# Patient Record
Sex: FEMALE | Race: WHITE | URBAN - METROPOLITAN AREA
[De-identification: names, ages, dates, MRNs, and addresses within clinical notes are randomized per-mention and may not be internally consistent; named-entity substitution may affect disease eponyms.]

---

## 2017-07-21 LAB
HBV SURFACE AG SERPL QL IA: NORMAL
HIV 1+2 AB+HIV1 P24 AG SERPL QL IA: NORMAL
RUBELLA ABY IGG: NORMAL
T PALLIDUM IGG SER QL: NORMAL

## 2017-08-28 ENCOUNTER — RADIANT APPOINTMENT (OUTPATIENT)
Dept: GENERAL RADIOLOGY | Facility: CLINIC | Age: 32
End: 2017-08-28
Attending: FAMILY MEDICINE
Payer: COMMERCIAL

## 2017-08-28 ENCOUNTER — OFFICE VISIT (OUTPATIENT)
Dept: URGENT CARE | Facility: URGENT CARE | Age: 32
End: 2017-08-28
Payer: OTHER MISCELLANEOUS

## 2017-08-28 VITALS
SYSTOLIC BLOOD PRESSURE: 128 MMHG | OXYGEN SATURATION: 100 % | DIASTOLIC BLOOD PRESSURE: 70 MMHG | HEART RATE: 92 BPM | WEIGHT: 225 LBS | TEMPERATURE: 98.3 F

## 2017-08-28 DIAGNOSIS — S99.911A ANKLE INJURY, RIGHT, INITIAL ENCOUNTER: Primary | ICD-10-CM

## 2017-08-28 PROCEDURE — 73630 X-RAY EXAM OF FOOT: CPT | Mod: RT

## 2017-08-28 PROCEDURE — 99203 OFFICE O/P NEW LOW 30 MIN: CPT | Performed by: FAMILY MEDICINE

## 2017-08-28 RX ORDER — PRENATAL VIT/IRON FUM/FOLIC AC 27MG-0.8MG
1 TABLET ORAL DAILY
COMMUNITY

## 2017-08-28 NOTE — PROGRESS NOTES
SUBJECTIVE:  Chief Complaint   Patient presents with     Work Comp     Pt states twsited ankle at work 14x weeks pregant 3x hrs     Urgent Care   .ident presents with a chief complaint of right foot.  The injury occurred hours ago.   The injury happened while at work.   How: twisted immediate pain  The patient complained of moderate pain and has had decreased ROM.    Pain exacerbated by weight-bearing and movement    He treated it initially with no therapy.   This is the first time this type of injury has occurred to this patient.     No past medical history on file.  Allergies   Allergen Reactions     Vicodin [Hydrocodone-Acetaminophen]      Social History   Substance Use Topics     Smoking status: Never Smoker     Smokeless tobacco: Not on file     Alcohol use Yes      Comment: occ       ROSINTEGUMENTARY/SKIN: NEGATIVE for open wound/bleeding and NEGATIVE for bruising  MUSCULOSKELETAL: NEGATIVE for joint swelling, paresthesias, radicular pain    EXAM: /70 (BP Location: Left arm, Patient Position: Chair, Cuff Size: Adult Regular)  Pulse 92  Temp 98.3  F (36.8  C) (Oral)  Wt 225 lb (102.1 kg)  SpO2 100%Gen: healthy,alert,no distress  Extremity: foot has pain with palpation and rom.   There is not compromise to the distal circulation.  Pulses are +2 and CRT is brisk.GENERAL APPEARANCE: healthy, alert and no distress  EXTREMITIES: peripheral pulses normal  SKIN: no suspicious lesions or rashes  NEURO: Normal strength and tone, sensory exam grossly normal, mentation intact and speech normal    Xray without acute findings, read by Adams Adkins D.O.      ICD-10-CM    1. Ankle injury, right, initial encounter S99.911A XR Foot Right G/E 3 Views     order for DME     RICE  F/U RECHECK 48-72 HRS

## 2017-08-28 NOTE — LETTER
[facility] [address] [address]  [phone]        2017    REPORT OF WORK ABILITY    PATIENT DATA  Employee Name: Kat Stone        : 1985   [id number]  Work related injury: YES  Today's date: 2017  Date of injury: 2017     PROVIDER EVALUATION: Please fill in as needed.  Please give copy to employee for employer.  1. Diagnosis: Sprain/strain rt foot  2. Treatment: cam walker and crutches  3. Medication: tylenol  NOTE: When ordering a medication, [place] Rules require Work Comp or WC on prescriptions.  4. Return to work date: 17 with the following: * Other: using cam walker and icing crutches and elevating leg periodically  DURATION OF LIMITATIONS: rest of week      RESTRICTIONS: Unlimited unless listed.  Restrictions apply to home and leisure also.  If work within restrictions is not available, the employee is totally disabled.  Provider comments: none  Medical Examiner: Adams Adkins      License or registration: [id number]    Next appointment: f/u later this week with PCP    CC: Employer, Managed Care Plan/Payor, Patient

## 2017-08-28 NOTE — NURSING NOTE
Chief Complaint   Patient presents with     Work Comp     Pt states twsited ankle at work 14x weeks pregant 3x hrs     Urgent Care       Initial /70 (BP Location: Left arm, Patient Position: Chair, Cuff Size: Adult Regular)  Pulse 92  Temp 98.3  F (36.8  C) (Oral)  Wt 225 lb (102.1 kg)  SpO2 100% There is no height or weight on file to calculate BMI.  Medication Reconciliation: complete

## 2017-08-28 NOTE — MR AVS SNAPSHOT
"              After Visit Summary   2017    Kat Stone    MRN: [id number]           Patient Information     Date Of Birth          1985        Visit Information        Provider Department      2017 5:40 PM Adams Adkins,  [facility]        Today's Diagnoses     Ankle injury, right, initial encounter    -  1       Follow-ups after your visit        Who to contact     If you have questions or need follow up information about today's clinic visit or your schedule please contact [facility] directly at [phone].  Normal or non-critical lab and imaging results will be communicated to you by [employer], letter or phone within 4 business days after the clinic has received the results. If you do not hear from us within 7 days, please contact the clinic through [employer] or phone. If you have a critical or abnormal lab result, we will notify you by phone as soon as possible.  Submit refill requests through [employer] or call your pharmacy and they will forward the refill request to us. Please allow 3 business days for your refill to be completed.          Additional Information About Your Visit        MyChart Information     [employer] lets you send messages to your doctor, view your test results, renew your prescriptions, schedule appointments and more. To sign up, go to [email or web address] . Click on \"Log in\" on the left side of the screen, which will take you to the Welcome page. Then click on \"Sign up Now\" on the right side of the page.     You will be asked to enter the access code listed below, as well as some personal information. Please follow the directions to create your username and password.     Your access code is: [email or web address]  Expires: 2017  7:05 PM     Your access code will  in 90 days. If you need help or a new code, please call your [place] clinic or [phone].        Care EveryWhere ID     This is your Care " EveryWhere ID. This could be used by other organizations to access your [place] medical records  [id number]        Your Vitals Were     Pulse Temperature Pulse Oximetry             92 98.3  F (36.8  C) (Oral) 100%          Blood Pressure from Last 3 Encounters:   08/28/17 128/70   08/24/15 (!) 150/93    Weight from Last 3 Encounters:   08/28/17 225 lb (102.1 kg)              We Performed the Following     XR Foot Right G/E 3 Views          Today's Medication Changes          These changes are accurate as of: 8/28/17  7:05 PM.  If you have any questions, ask your nurse or doctor.               Start taking these medicines.        Dose/Directions    order for DME   Used for:  Ankle injury, right, initial encounter   Started by:  Adams Adkins, DO Sosa cam walker   Quantity:  1 Device   Refills:  0            Where to get your medicines      Some of these will need a paper prescription and others can be bought over the counter.  Ask your nurse if you have questions.     You don't need a prescription for these medications     order for DME                Primary Care Provider Office Phone # Fax #    [facility] [phone] [address] [address]        Equal Access to Services     NATASHA RENEE AH: Hadii roberto ku hadasho Soomaali, waaxda luqadaha, qaybta kaalmada adeegyada, waxay williams gastelum. So [facility] [phone].    ATENCIÓN: Si habla español, tiene a walls disposición servicios gratuitos de asistencia lingüística. Llame al [phone].    We comply with applicable federal civil rights laws and [place] laws. We do not discriminate on the basis of race, color, national origin, age, disability sex, sexual orientation or gender identity.            Thank you!     Thank you for choosing [facility]  for your care. Our goal is always to provide you with excellent care. Hearing back from our patients is one way we  can continue to improve our services. Please take a few minutes to complete the written survey that you may receive in the mail after your visit with us. Thank you!             Your Updated Medication List - Protect others around you: Learn how to safely use, store and throw away your medicines at www.disposemymeds.org.          This list is accurate as of: 8/28/17  7:05 PM.  Always use your most recent med list.                   Brand Name Dispense Instructions for use Diagnosis    neomycin-polymyxin-hydrocortisone 3.5-08676-1 otic suspension    CORTISPORIN    10 mL    Place 4 drops in ear(s) 4 times daily        order for DME     1 Device    Short cam walker    Ankle injury, right, initial encounter       prenatal multivitamin plus iron 27-0.8 MG Tabs per tablet      Take 1 tablet by mouth daily

## 2018-01-28 LAB — GROUP B STREP PCR: POSITIVE

## 2018-02-08 ENCOUNTER — TRANSFERRED RECORDS (OUTPATIENT)
Dept: HEALTH INFORMATION MANAGEMENT | Facility: CLINIC | Age: 33
End: 2018-02-08

## 2018-02-28 ENCOUNTER — HOSPITAL ENCOUNTER (INPATIENT)
Facility: CLINIC | Age: 33
LOS: 3 days | Discharge: HOME HEALTH | End: 2018-03-03
Attending: OBSTETRICS & GYNECOLOGY | Admitting: OBSTETRICS & GYNECOLOGY
Payer: COMMERCIAL

## 2018-02-28 ENCOUNTER — ANESTHESIA EVENT (OUTPATIENT)
Dept: OBGYN | Facility: CLINIC | Age: 33
End: 2018-02-28
Payer: COMMERCIAL

## 2018-02-28 ENCOUNTER — ANESTHESIA (OUTPATIENT)
Dept: OBGYN | Facility: CLINIC | Age: 33
End: 2018-02-28
Payer: COMMERCIAL

## 2018-02-28 PROBLEM — O99.213 OBESITY AFFECTING PREGNANCY IN THIRD TRIMESTER, ANTEPARTUM: Status: ACTIVE | Noted: 2018-02-28

## 2018-02-28 PROBLEM — R62.52 GROWTH RETARDATION: Status: ACTIVE | Noted: 2018-02-28

## 2018-02-28 PROBLEM — O35.EXX0 PYELECTASIS OF FETUS ON PRENATAL ULTRASOUND: Status: ACTIVE | Noted: 2018-02-28

## 2018-02-28 PROBLEM — O48.0 POST-TERM PREGNANCY, 40-42 WEEKS OF GESTATION: Status: ACTIVE | Noted: 2018-02-28

## 2018-02-28 PROBLEM — E66.01 MORBID OBESITY (H): Status: ACTIVE | Noted: 2018-02-28

## 2018-02-28 PROBLEM — R62.52 GROWTH RETARDATION: Status: RESOLVED | Noted: 2018-02-28 | Resolved: 2018-02-28

## 2018-02-28 PROBLEM — B95.1 GROUP BETA STREP POSITIVE: Status: ACTIVE | Noted: 2018-02-28

## 2018-02-28 LAB
ABO + RH BLD: NORMAL
ABO + RH BLD: NORMAL
BLD GP AB SCN SERPL QL: NORMAL
BLOOD BANK CMNT PATIENT-IMP: NORMAL
HGB BLD-MCNC: 12.6 G/DL (ref 11.7–15.7)
SPECIMEN EXP DATE BLD: NORMAL
T PALLIDUM IGG+IGM SER QL: NEGATIVE

## 2018-02-28 PROCEDURE — 3E0R3BZ INTRODUCTION OF ANESTHETIC AGENT INTO SPINAL CANAL, PERCUTANEOUS APPROACH: ICD-10-PCS | Performed by: ANESTHESIOLOGY

## 2018-02-28 PROCEDURE — 86901 BLOOD TYPING SEROLOGIC RH(D): CPT | Performed by: OBSTETRICS & GYNECOLOGY

## 2018-02-28 PROCEDURE — 00HU33Z INSERTION OF INFUSION DEVICE INTO SPINAL CANAL, PERCUTANEOUS APPROACH: ICD-10-PCS | Performed by: ANESTHESIOLOGY

## 2018-02-28 PROCEDURE — 25000125 ZZHC RX 250: Performed by: ANESTHESIOLOGY

## 2018-02-28 PROCEDURE — 37000011 ZZH ANESTHESIA WARD SERVICE

## 2018-02-28 PROCEDURE — 86850 RBC ANTIBODY SCREEN: CPT | Performed by: OBSTETRICS & GYNECOLOGY

## 2018-02-28 PROCEDURE — 27110038 ZZH RX 271: Performed by: ANESTHESIOLOGY

## 2018-02-28 PROCEDURE — 10907ZC DRAINAGE OF AMNIOTIC FLUID, THERAPEUTIC FROM PRODUCTS OF CONCEPTION, VIA NATURAL OR ARTIFICIAL OPENING: ICD-10-PCS | Performed by: OBSTETRICS & GYNECOLOGY

## 2018-02-28 PROCEDURE — 25000125 ZZHC RX 250: Performed by: OBSTETRICS & GYNECOLOGY

## 2018-02-28 PROCEDURE — 86780 TREPONEMA PALLIDUM: CPT | Performed by: OBSTETRICS & GYNECOLOGY

## 2018-02-28 PROCEDURE — 25000128 H RX IP 250 OP 636: Performed by: ANESTHESIOLOGY

## 2018-02-28 PROCEDURE — 12000031 ZZH R&B OB CRITICAL

## 2018-02-28 PROCEDURE — 85018 HEMOGLOBIN: CPT | Performed by: OBSTETRICS & GYNECOLOGY

## 2018-02-28 PROCEDURE — 86900 BLOOD TYPING SEROLOGIC ABO: CPT | Performed by: OBSTETRICS & GYNECOLOGY

## 2018-02-28 PROCEDURE — 25000128 H RX IP 250 OP 636: Performed by: OBSTETRICS & GYNECOLOGY

## 2018-02-28 PROCEDURE — 40000671 ZZH STATISTIC ANESTHESIA CASE

## 2018-02-28 PROCEDURE — 3E033VJ INTRODUCTION OF OTHER HORMONE INTO PERIPHERAL VEIN, PERCUTANEOUS APPROACH: ICD-10-PCS | Performed by: OBSTETRICS & GYNECOLOGY

## 2018-02-28 RX ORDER — METHYLERGONOVINE MALEATE 0.2 MG/ML
200 INJECTION INTRAVENOUS
Status: DISCONTINUED | OUTPATIENT
Start: 2018-02-28 | End: 2018-03-01

## 2018-02-28 RX ORDER — ONDANSETRON 2 MG/ML
4 INJECTION INTRAMUSCULAR; INTRAVENOUS EVERY 6 HOURS PRN
Status: DISCONTINUED | OUTPATIENT
Start: 2018-02-28 | End: 2018-03-01

## 2018-02-28 RX ORDER — NALOXONE HYDROCHLORIDE 0.4 MG/ML
.1-.4 INJECTION, SOLUTION INTRAMUSCULAR; INTRAVENOUS; SUBCUTANEOUS
Status: DISCONTINUED | OUTPATIENT
Start: 2018-02-28 | End: 2018-03-01

## 2018-02-28 RX ORDER — IBUPROFEN 800 MG/1
800 TABLET, FILM COATED ORAL
Status: COMPLETED | OUTPATIENT
Start: 2018-02-28 | End: 2018-03-01

## 2018-02-28 RX ORDER — OXYTOCIN 10 [USP'U]/ML
10 INJECTION, SOLUTION INTRAMUSCULAR; INTRAVENOUS
Status: DISCONTINUED | OUTPATIENT
Start: 2018-02-28 | End: 2018-03-01

## 2018-02-28 RX ORDER — HYDROMORPHONE HYDROCHLORIDE 1 MG/ML
INJECTION, SOLUTION INTRAMUSCULAR; INTRAVENOUS; SUBCUTANEOUS
Status: COMPLETED
Start: 2018-02-28 | End: 2018-02-28

## 2018-02-28 RX ORDER — PENICILLIN G POTASSIUM 5000000 [IU]/1
5 INJECTION, POWDER, FOR SOLUTION INTRAMUSCULAR; INTRAVENOUS ONCE
Status: COMPLETED | OUTPATIENT
Start: 2018-02-28 | End: 2018-02-28

## 2018-02-28 RX ORDER — EPHEDRINE SULFATE 50 MG/ML
5 INJECTION, SOLUTION INTRAMUSCULAR; INTRAVENOUS; SUBCUTANEOUS
Status: DISCONTINUED | OUTPATIENT
Start: 2018-02-28 | End: 2018-03-01

## 2018-02-28 RX ORDER — ACETAMINOPHEN 325 MG/1
650 TABLET ORAL EVERY 4 HOURS PRN
Status: DISCONTINUED | OUTPATIENT
Start: 2018-02-28 | End: 2018-03-01

## 2018-02-28 RX ORDER — NALBUPHINE HYDROCHLORIDE 10 MG/ML
2.5-5 INJECTION, SOLUTION INTRAMUSCULAR; INTRAVENOUS; SUBCUTANEOUS EVERY 6 HOURS PRN
Status: DISCONTINUED | OUTPATIENT
Start: 2018-02-28 | End: 2018-03-01

## 2018-02-28 RX ORDER — OXYTOCIN/0.9 % SODIUM CHLORIDE 30/500 ML
1-24 PLASTIC BAG, INJECTION (ML) INTRAVENOUS CONTINUOUS
Status: DISCONTINUED | OUTPATIENT
Start: 2018-02-28 | End: 2018-03-01

## 2018-02-28 RX ORDER — FENTANYL CITRATE 50 UG/ML
50-100 INJECTION, SOLUTION INTRAMUSCULAR; INTRAVENOUS
Status: DISCONTINUED | OUTPATIENT
Start: 2018-02-28 | End: 2018-03-01

## 2018-02-28 RX ORDER — SODIUM CHLORIDE, SODIUM LACTATE, POTASSIUM CHLORIDE, CALCIUM CHLORIDE 600; 310; 30; 20 MG/100ML; MG/100ML; MG/100ML; MG/100ML
INJECTION, SOLUTION INTRAVENOUS CONTINUOUS
Status: DISCONTINUED | OUTPATIENT
Start: 2018-02-28 | End: 2018-03-01

## 2018-02-28 RX ORDER — OXYCODONE AND ACETAMINOPHEN 5; 325 MG/1; MG/1
1 TABLET ORAL
Status: DISCONTINUED | OUTPATIENT
Start: 2018-02-28 | End: 2018-03-01

## 2018-02-28 RX ORDER — OXYTOCIN/0.9 % SODIUM CHLORIDE 30/500 ML
100-340 PLASTIC BAG, INJECTION (ML) INTRAVENOUS CONTINUOUS PRN
Status: COMPLETED | OUTPATIENT
Start: 2018-02-28 | End: 2018-03-01

## 2018-02-28 RX ORDER — ONDANSETRON 4 MG/1
4 TABLET, ORALLY DISINTEGRATING ORAL EVERY 6 HOURS PRN
Status: DISCONTINUED | OUTPATIENT
Start: 2018-02-28 | End: 2018-03-01

## 2018-02-28 RX ORDER — CARBOPROST TROMETHAMINE 250 UG/ML
250 INJECTION, SOLUTION INTRAMUSCULAR
Status: DISCONTINUED | OUTPATIENT
Start: 2018-02-28 | End: 2018-03-01

## 2018-02-28 RX ADMIN — Medication: at 18:35

## 2018-02-28 RX ADMIN — PENICILLIN G POTASSIUM 5 MILLION UNITS: 5000000 POWDER, FOR SOLUTION INTRAMUSCULAR; INTRAPLEURAL; INTRATHECAL; INTRAVENOUS at 09:19

## 2018-02-28 RX ADMIN — Medication 15 ML: at 18:30

## 2018-02-28 RX ADMIN — SODIUM CHLORIDE 2.5 MILLION UNITS: 900 INJECTION, SOLUTION INTRAVENOUS at 17:31

## 2018-02-28 RX ADMIN — ONDANSETRON 4 MG: 2 INJECTION INTRAMUSCULAR; INTRAVENOUS at 23:08

## 2018-02-28 RX ADMIN — EPHEDRINE SULFATE 5 MG: 50 INJECTION INTRAMUSCULAR; INTRAVENOUS; SUBCUTANEOUS at 18:43

## 2018-02-28 RX ADMIN — SODIUM CHLORIDE, POTASSIUM CHLORIDE, SODIUM LACTATE AND CALCIUM CHLORIDE: 600; 310; 30; 20 INJECTION, SOLUTION INTRAVENOUS at 18:58

## 2018-02-28 RX ADMIN — SODIUM CHLORIDE, POTASSIUM CHLORIDE, SODIUM LACTATE AND CALCIUM CHLORIDE: 600; 310; 30; 20 INJECTION, SOLUTION INTRAVENOUS at 08:30

## 2018-02-28 RX ADMIN — OXYTOCIN-SODIUM CHLORIDE 0.9% IV SOLN 30 UNIT/500ML 2 MILLI-UNITS/MIN: 30-0.9/5 SOLUTION at 09:23

## 2018-02-28 RX ADMIN — SODIUM CHLORIDE 2.5 MILLION UNITS: 900 INJECTION, SOLUTION INTRAVENOUS at 13:30

## 2018-02-28 RX ADMIN — SODIUM CHLORIDE, POTASSIUM CHLORIDE, SODIUM LACTATE AND CALCIUM CHLORIDE: 600; 310; 30; 20 INJECTION, SOLUTION INTRAVENOUS at 15:36

## 2018-02-28 RX ADMIN — HYDROMORPHONE HYDROCHLORIDE 0.5 MG: 1 INJECTION, SOLUTION INTRAMUSCULAR; INTRAVENOUS; SUBCUTANEOUS at 18:30

## 2018-02-28 RX ADMIN — SODIUM CHLORIDE 2.5 MILLION UNITS: 900 INJECTION, SOLUTION INTRAVENOUS at 21:38

## 2018-02-28 NOTE — IP AVS SNAPSHOT
MRN:[id number]                      After Visit Summary   2/28/2018    Kat Godoy    MRN: [id number]           Thank you!     Thank you for choosing [facility] for your care. Our goal is always to provide you with excellent care. Hearing back from our patients is one way we can continue to improve our services. Please take a few minutes to complete the written survey that you may receive in the mail after you visit. If you would like to speak to someone directly about your visit please contact Patient Relations at [phone]. Thank you!          Patient Information     Date Of Birth          1985        Designated Caregiver       Most Recent Value    Caregiver    Will someone help with your care after discharge? no      About your hospital stay     You were admitted on:  February 28, 2018 You last received care in the:  [facility]partum    You were discharged on:  March 3, 2018        Reason for your hospital stay       Maternity care                  Who to Call     For medical emergencies, please call 911.  For non-urgent questions about your medical care, please call your primary care provider or clinic, [phone]          Attending Provider     Provider Specialty    Marlyn Castelan MD OB/Gyn    Evelio Hollis OB/Gyn    Estephania Kessler MD OB/Gyn       Primary Care Provider Office Phone # Fax #    Mary Willett [phone]      After Care Instructions     Activity       Review discharge instructions            Diet       Resume previous diet            Discharge Instructions - Postpartum visit       Schedule postpartum visit with your OB provider and return to clinic in 6 weeks.                  Further instructions from your care team       Postpartum Vaginal Delivery Instructions    Lactation [phone]  [facility] [phone]    Activity       Ask family and friends for help when you need  it.    Do not place anything in your vagina for 6 weeks.    You are not restricted on other activities, but take it easy for a few weeks to allow your body to recover from delivery.  You are able to do any activities you feel up to that point.    No driving until you have stopped taking your pain medications (usually two weeks after delivery).     Call your health care provider if you have any of these symptoms:       Increased pain, swelling, redness, or fluid around your stiches from an episiotomy or perineal tear.    A fever above 100.4 F (38 C) with or without chills when placing a thermometer under your tongue.    You soak a sanitary pad with blood within 1 hour, or you see blood clots larger than a golf ball.    Bleeding that lasts more than 6 weeks.    Vaginal discharge that smells bad.    Severe pain, cramping or tenderness in your lower belly area.    A need to urinate more frequently (use the toilet more often), more urgently (use the toilet very quickly), or it burns when you urinate.    Nausea and vomiting.    Redness, swelling or pain around a vein in your leg.    Problems breastfeeding or a red or painful area on your breast.    Chest pain and cough or are gasping for air.    Problems coping with sadness, anxiety, or depression.  If you have any concerns about hurting yourself or the baby, call your provider immediately.     You have questions or concerns after you return home.     Keep your hands clean:  Always wash your hands before touching your perineal area and stitches.  This helps reduce your risk of infection.  If your hands aren't dirty, you may use an alcohol hand-rub to clean your hands. Keep your nails clean and short.        Pending Results     No orders found from 2/26/2018 to 3/1/2018.            Statement of Approval     Ordered          03/03/18 0807  I have reviewed and agree with all the recommendations and orders detailed in this document.  EFFECTIVE NOW     Approved and  "electronically signed by:  Savage Mcgovern MD             Admission Information     Date & Time Provider Department Dept. Phone    2018 Estephania Kessler MD [facility] [phone]      Your Vitals Were     Blood Pressure Pulse Temperature Respirations Height Weight    130/86 88 98.1  F (36.7  C) (Oral) 16 1.6 m (5' 3\") 112 kg (247 lb)    BMI (Body Mass Index)                   43.75 kg/m2           [employer] Information     [employer] lets you send messages to your doctor, view your test results, renew your prescriptions, schedule appointments and more. To sign up, go to [email or web address] . Click on \"Log in\" on the left side of the screen, which will take you to the Welcome page. Then click on \"Sign up Now\" on the right side of the page.     You will be asked to enter the access code listed below, as well as some personal information. Please follow the directions to create your username and password.     Your access code is: [email or web address]  Expires: 2018  9:40 AM     Your access code will  in 90 days. If you need help or a new code, please call your [place] clinic or [phone].        Care EveryWhere ID     This is your Care EveryWhere ID. This could be used by other organizations to access your [place] medical records  [id number]        Equal Access to Services     [facility] Hospital[facility]REA AH: Hadii roberto jacinto hadasho Soapolinarali, waaxda luqadaha, qaybta kaalmada adeegyada, talib jeff . So [facility] [phone].    ATENCIÓN: Si habla español, tiene a walls disposición servicios gratuitos de asistencia lingüística. Llame al [phone].    We comply with applicable federal civil rights laws and [place] laws. We do not discriminate on the basis of race, color, national origin, age, disability, sex, sexual orientation, or gender identity.               Review of your medicines      CONTINUE these medicines which have NOT CHANGED        Dose / Directions    " prenatal multivitamin plus iron 27-0.8 MG Tabs per tablet        Dose:  1 tablet   Take 1 tablet by mouth daily   Refills:  0       ZOLOFT PO        Dose:  50 mg   Take 50 mg by mouth daily   Refills:  0                Protect others around you: Learn how to safely use, store and throw away your medicines at www.disposemymeds.org.             Medication List: This is a list of all your medications and when to take them. Check marks below indicate your daily home schedule. Keep this list as a reference.      Medications           Morning Afternoon Evening Bedtime As Needed    prenatal multivitamin plus iron 27-0.8 MG Tabs per tablet   Take 1 tablet by mouth daily                                ZOLOFT PO   Take 50 mg by mouth daily

## 2018-02-28 NOTE — PROVIDER NOTIFICATION
18 0825   Provider Notification   Provider Name/Title Dr Castelan   Method of Notification Electronic Page   Request Evaluate - Remote   Notification Reason Patient Arrived   OB updated on patient arrival, , 40.4, IUGR, GBS pos, uterine activity. Orders received to check cervix now, OB will place labor orders and come see patient.

## 2018-02-28 NOTE — PLAN OF CARE
Problem: Labor (Cervical Ripen, Induct, Augment) (Adult,Obstetrics,Pediatric)  Goal: Signs and Symptoms of Listed Potential Problems Will be Absent, Minimized or Managed (Labor)  Signs and symptoms of listed potential problems will be absent, minimized or managed by discharge/transition of care (reference Labor (Cervical Ripen, Induct, Augment) (Adult,Obstetrics,Pediatric) CPG).  , 40.4 weeks here for induction of labor for IUGR. External monitors applied and explained, health history obtained. GBS pos.

## 2018-02-28 NOTE — PROVIDER NOTIFICATION
02/28/18 1345   Provider Notification   Provider Name/Title Dr Castelan   Method of Notification In Department   Will check cervix shortly.

## 2018-02-28 NOTE — PROVIDER NOTIFICATION
02/28/18 1413   Provider Notification   Provider Name/Title Dr Castelan   Method of Notification At Bedside   OB at bedside to SVE and AROM. Scant amount of meconium stained fluid noted. No new orders received.

## 2018-02-28 NOTE — IP AVS SNAPSHOT
[facility]    [address]    [facility] [address]    Phone:  [phone]    Fax:  [phone]                                       After Visit Summary   2/28/2018    Kat Godoy    MRN: [id number]           After Visit Summary Signature Page     I have received my discharge instructions, and my questions have been answered. I have discussed any challenges I see with this plan with the nurse or doctor.    ..........................................................................................................................................  Patient/Patient Representative Signature      ..........................................................................................................................................  Patient Representative Print Name and Relationship to Patient    ..................................................               ................................................  Date                                            Time    ..........................................................................................................................................  Reviewed by Signature/Title    ...................................................              ..............................................  Date                                                            Time

## 2018-02-28 NOTE — PROVIDER NOTIFICATION
02/28/18 0900   Provider Notification   Provider Name/Title Dr Castelan   Method of Notification At Bedside   OB at bedside. Updated on SVE, discussed plan with patient to AROM after 4 hours of antibiotics. Orders received to start penicillin and pitocin now. OB will be in-house or at clinic.

## 2018-02-28 NOTE — PROGRESS NOTES
Pitocin at 5 mu/min, Ctx q 2 minutes.  SVE 1.5/80/0, AROM dark meconium, watery.    FHT category 1.  Continue current mgmt, anticipate .    Marlyn Castelan MD 2018

## 2018-02-28 NOTE — PROVIDER NOTIFICATION
02/28/18 1743   Provider Notification   Provider Name/Title Dr Castelan   Method of Notification In Department   OB updated on SVE, going to be getting epidural.

## 2018-02-28 NOTE — PROGRESS NOTES
"Received copy of laszt growth US 2/8/18, 2702g, 13%ile.  AC 15%ile, HC and BPD <1%ile.  CELIA 14.5.  BPP 8/8. Baby was deemed \"no longer growth restricted\" by perinatology.  Induction will now be for post dates.    Marlyn Castelan MD February 28, 2018     "

## 2018-02-28 NOTE — PROVIDER NOTIFICATION
02/28/18 1722   Provider Notification   Provider Name/Title Dr Castelan   Method of Notification Phone   OB updated on plan to recheck cervix at 1730, patient much more uncomfortable now. OB will be up on unit soon, may update then.

## 2018-02-28 NOTE — H&P
"Labor and Delivery H&P    Kat Godoy is a 32 year old  at 40 4/7 weeks gestation  who presents to labor and delivery for induction of labor.  Pregnancy complicated by maternal obesity (BMI 43), + GBBS,  and fetal growth restriction with last US  showing EFW 13%ile, HC 1%ile.  IOL was recommended earlier but pt declined until now.  Passed GCT.  18# TWG this pregnancy.    On arrival, having ctx q 2 minutes but not feeling them.  Reports good FM.  Fetal pyelectasis on earlier US.    Patient Active Problem List   Diagnosis     Obesity affecting pregnancy in third trimester, antepartum     Morbid obesity (H)     Growth retardation     Group beta Strep positive     Pyelectasis of fetus on prenatal ultrasound       Current Facility-Administered Medications   Medication     lactated ringers infusion     lactated ringers BOLUS 500 mL     lactated ringers BOLUS 1,000 mL    Or     lactated ringers BOLUS 500 mL     acetaminophen (TYLENOL) tablet 650 mg     naloxone (NARCAN) injection 0.1-0.4 mg     ondansetron (ZOFRAN) injection 4 mg     oxytocin (PITOCIN) injection 10 Units     oxytocin (PITOCIN) 30 units in 500 mL 0.9% NaCl infusion     Medication Instructions: misoprostol (CYTOTEC)- Nurse to discuss ordering with provider, if needed. Ordered via \"OB misoprostol (CYTOTEC) Postpartum Hemorrhage PANEL\"     methylergonovine (METHERGINE) injection 200 mcg     carboprost (HEMABATE) injection 250 mcg     lidocaine 1 % 0.1-20 mL     ibuprofen (ADVIL/MOTRIN) tablet 800 mg     oxyCODONE-acetaminophen (PERCOCET) 5-325 MG per tablet 1 tablet     oxytocin (PITOCIN) 30 units in 500 mL 0.9% NaCl infusion     nitrous oxide/oxygen 50/50 blend     fentaNYL (PF) (SUBLIMAZE) injection  mcg     penicillin G potassium injection 5 Million Units    Followed by     penicillin G potassium injection 2.5 Million Units       Allergies:  vicodin causes nausea      Obstetric History       T0      L0     SAB0   TAB0   " "Ectopic0   Multiple0   Live Births0       # Outcome Date GA Lbr Ayden/2nd Weight Sex Delivery Anes PTL Lv   1 Current                   Past Medical History:   Diagnosis Date     Mental disorder     depression and anxiety       Past Surgical History:   Procedure Laterality Date     CHOLECYSTECTOMY         ROS: negative    OBJECTIVE:  Blood pressure 123/76, temperature 98.1  F (36.7  C), temperature source Oral, resp. rate 18, height 1.6 m (5' 3\"), weight 112 kg (247 lb), not currently breastfeeding.    WDWN gravid woman in NAD  Lungs clear  CV RRR  Abd gravid, nontender, vertex by leopolds, EFW difficult due to obesity, but reports last US EFW <6# on .  Report not yet available to me.    SVE: per RN /-2    McMullin: q 2 min  FHT category 1.      ASSESSMENT:  32 year old yo  at 40 4/7 weeks for induction of labor.Pregnancy complicated by growth restriction, maternal obesity with BMI 43, GBBS +, and fetal pyelectasis on US.    PLAN:  Admit to labor and delivery.  Start PCN for GBBS prophylaxis.  Recommended pitocin induction for rapid control, and will need continuous fetal monitoring due to IUGR, may have higher risk of fetal intolerance of labor and we will monitor for this.  Routine intrapartum care.    Marlyn Castelan MD  2018  "

## 2018-03-01 PROCEDURE — 12000031 ZZH R&B OB CRITICAL

## 2018-03-01 PROCEDURE — 40000084 ZZH STATISTIC IP LACTATION SERVICES 16-30 MIN

## 2018-03-01 PROCEDURE — 25000132 ZZH RX MED GY IP 250 OP 250 PS 637: Performed by: OBSTETRICS & GYNECOLOGY

## 2018-03-01 PROCEDURE — 0HQ9XZZ REPAIR PERINEUM SKIN, EXTERNAL APPROACH: ICD-10-PCS | Performed by: OBSTETRICS & GYNECOLOGY

## 2018-03-01 PROCEDURE — 25000128 H RX IP 250 OP 636: Performed by: OBSTETRICS & GYNECOLOGY

## 2018-03-01 PROCEDURE — 72200001 ZZH LABOR CARE VAGINAL DELIVERY SINGLE

## 2018-03-01 PROCEDURE — 25000125 ZZHC RX 250: Performed by: OBSTETRICS & GYNECOLOGY

## 2018-03-01 RX ORDER — AMOXICILLIN 250 MG
2 CAPSULE ORAL 2 TIMES DAILY PRN
Status: DISCONTINUED | OUTPATIENT
Start: 2018-03-01 | End: 2018-03-03 | Stop reason: HOSPADM

## 2018-03-01 RX ORDER — HYDROCORTISONE 2.5 %
CREAM (GRAM) TOPICAL 3 TIMES DAILY PRN
Status: DISCONTINUED | OUTPATIENT
Start: 2018-03-01 | End: 2018-03-03 | Stop reason: HOSPADM

## 2018-03-01 RX ORDER — IBUPROFEN 800 MG/1
800 TABLET, FILM COATED ORAL EVERY 6 HOURS PRN
Status: DISCONTINUED | OUTPATIENT
Start: 2018-03-01 | End: 2018-03-03 | Stop reason: HOSPADM

## 2018-03-01 RX ORDER — MISOPROSTOL 200 UG/1
800 TABLET ORAL
Status: DISCONTINUED | OUTPATIENT
Start: 2018-03-01 | End: 2018-03-03 | Stop reason: HOSPADM

## 2018-03-01 RX ORDER — OXYCODONE HYDROCHLORIDE 5 MG/1
5 TABLET ORAL EVERY 4 HOURS PRN
Status: DISCONTINUED | OUTPATIENT
Start: 2018-03-01 | End: 2018-03-03 | Stop reason: HOSPADM

## 2018-03-01 RX ORDER — AMOXICILLIN 250 MG
1 CAPSULE ORAL 2 TIMES DAILY PRN
Status: DISCONTINUED | OUTPATIENT
Start: 2018-03-01 | End: 2018-03-03 | Stop reason: HOSPADM

## 2018-03-01 RX ORDER — OXYTOCIN 10 [USP'U]/ML
10 INJECTION, SOLUTION INTRAMUSCULAR; INTRAVENOUS
Status: DISCONTINUED | OUTPATIENT
Start: 2018-03-01 | End: 2018-03-03 | Stop reason: HOSPADM

## 2018-03-01 RX ORDER — OXYTOCIN/0.9 % SODIUM CHLORIDE 30/500 ML
340 PLASTIC BAG, INJECTION (ML) INTRAVENOUS CONTINUOUS PRN
Status: DISCONTINUED | OUTPATIENT
Start: 2018-03-01 | End: 2018-03-03 | Stop reason: HOSPADM

## 2018-03-01 RX ORDER — OXYTOCIN/0.9 % SODIUM CHLORIDE 30/500 ML
100 PLASTIC BAG, INJECTION (ML) INTRAVENOUS CONTINUOUS
Status: DISCONTINUED | OUTPATIENT
Start: 2018-03-01 | End: 2018-03-03 | Stop reason: HOSPADM

## 2018-03-01 RX ORDER — BISACODYL 10 MG
10 SUPPOSITORY, RECTAL RECTAL DAILY PRN
Status: DISCONTINUED | OUTPATIENT
Start: 2018-03-03 | End: 2018-03-03 | Stop reason: HOSPADM

## 2018-03-01 RX ORDER — MISOPROSTOL 200 UG/1
TABLET ORAL
Status: DISCONTINUED
Start: 2018-03-01 | End: 2018-03-01 | Stop reason: HOSPADM

## 2018-03-01 RX ORDER — HYDROMORPHONE HYDROCHLORIDE 1 MG/ML
.3-.5 INJECTION, SOLUTION INTRAMUSCULAR; INTRAVENOUS; SUBCUTANEOUS EVERY 30 MIN PRN
Status: DISCONTINUED | OUTPATIENT
Start: 2018-03-01 | End: 2018-03-03 | Stop reason: HOSPADM

## 2018-03-01 RX ORDER — LANOLIN 100 %
OINTMENT (GRAM) TOPICAL
Status: DISCONTINUED | OUTPATIENT
Start: 2018-03-01 | End: 2018-03-03 | Stop reason: HOSPADM

## 2018-03-01 RX ORDER — CARBOPROST TROMETHAMINE 250 UG/ML
INJECTION, SOLUTION INTRAMUSCULAR
Status: DISCONTINUED
Start: 2018-03-01 | End: 2018-03-01 | Stop reason: HOSPADM

## 2018-03-01 RX ORDER — ACETAMINOPHEN 325 MG/1
650 TABLET ORAL EVERY 4 HOURS PRN
Status: DISCONTINUED | OUTPATIENT
Start: 2018-03-01 | End: 2018-03-03 | Stop reason: HOSPADM

## 2018-03-01 RX ORDER — NALOXONE HYDROCHLORIDE 0.4 MG/ML
.1-.4 INJECTION, SOLUTION INTRAMUSCULAR; INTRAVENOUS; SUBCUTANEOUS
Status: DISCONTINUED | OUTPATIENT
Start: 2018-03-01 | End: 2018-03-03 | Stop reason: HOSPADM

## 2018-03-01 RX ADMIN — IBUPROFEN 800 MG: 800 TABLET, FILM COATED ORAL at 06:32

## 2018-03-01 RX ADMIN — LIDOCAINE HYDROCHLORIDE 20 ML: 10 INJECTION, SOLUTION INFILTRATION; PERINEURAL at 05:22

## 2018-03-01 RX ADMIN — OXYTOCIN-SODIUM CHLORIDE 0.9% IV SOLN 30 UNIT/500ML 340 ML/HR: 30-0.9/5 SOLUTION at 05:16

## 2018-03-01 RX ADMIN — SODIUM CHLORIDE 2.5 MILLION UNITS: 900 INJECTION, SOLUTION INTRAVENOUS at 01:29

## 2018-03-01 RX ADMIN — SODIUM CHLORIDE, POTASSIUM CHLORIDE, SODIUM LACTATE AND CALCIUM CHLORIDE: 600; 310; 30; 20 INJECTION, SOLUTION INTRAVENOUS at 04:27

## 2018-03-01 RX ADMIN — IBUPROFEN 800 MG: 800 TABLET, FILM COATED ORAL at 12:32

## 2018-03-01 RX ADMIN — SENNOSIDES AND DOCUSATE SODIUM 1 TABLET: 8.6; 5 TABLET ORAL at 20:42

## 2018-03-01 RX ADMIN — OXYTOCIN-SODIUM CHLORIDE 0.9% IV SOLN 30 UNIT/500ML 2 MILLI-UNITS/MIN: 30-0.9/5 SOLUTION at 00:04

## 2018-03-01 RX ADMIN — IBUPROFEN 800 MG: 800 TABLET, FILM COATED ORAL at 20:42

## 2018-03-01 NOTE — LACTATION NOTE
LC to see patient and assist with latch.  Baby was rooting and interested.  Kat lacks confidence with ability to position infant.  LC assisted with positioning in the cross cradle hold.  Good colostrum noted with HE to entice latch.  Baby opens well and latches easily but occasionally breaks the latch when swallowing.  She was able to latch to both sides, with Kat more independent latching the second side.  Plan for continued support.  LC encouraged her to nurse her baby often and on demand, offering both sides with each nursing session.

## 2018-03-01 NOTE — PROVIDER NOTIFICATION
03/01/18 0010   Provider Notification   Provider Name/Title Dr. Hollis   Method of Notification In Department   Request Evaluate - Remote   Notification Reason SVE;Other (Comment)     OB updated SVE unchanged. Pit restarted. Unable to track contractions well. Order to place IUPC.

## 2018-03-01 NOTE — ANESTHESIA PROCEDURE NOTES
Peripheral nerve/Neuraxial procedure note : epidural catheter  Pre-Procedure  Performed by NANDO SHAH  Referred by NORTH CASTILLO  Location: OB      Pre-Anesthestic Checklist: patient identified, IV checked, risks and benefits discussed, informed consent, monitors and equipment checked, pre-op evaluation and at physician/surgeon's request    Timeout  Correct Patient: Yes   Correct Procedure: Yes   Correct Site: Yes   Correct Laterality: N/A   Correct Position: Yes   Site Marked: N/A   .   Procedure Documentation    .    Procedure:    Epidural catheter.     .  .       Assessment/Narrative  .  .  . Comments:  Pre-Procedure  Performed by Nando Shah MD  Location: OB.      PreAnesthestic Checklist: patient identified, IV checked, risks and benefits discussed, informed consent obtained, monitors and equipment checked, pre-op evaluation and at physician/surgeon's request.    Timeout   Correct Patient: Yes  Correct Procedure: Epidural catheter placement  Correct Site: Yes   Correct Position: Yes    Procedure Documentation  Procedure:   Epidural catheter block for Labor    Patient currently in labor and she and OBMD request a labor epidural to control her labor pains. Patient was interviewed and examined. Procedure and risks including but not limited to bleeding, infection, nerve injury, paralysis, PDPH, and inadequate block requiring intervention discussed with patient. Questions answered. This epidural is to be placed in anticipation of vaginal delivery.  She consents to the epidural procedure.  Time-out was performed.  I or my partners remain immediately available for management of any issues or complications and will monitor at appropriate intervals.  Procedure: Patient sitting. Betadine prep x 3. Sterile drape applied.  Mask and gloves used.  Lidocaine 1%  local infiltration at L 3-4.  17 G. Tuohy needle at L3-4 by loss of resistance into epidural space.  No CSF, paresthesia or blood. 1.5 % Lidocaine with 1:200,000  Epinephrine 5cc test dose. Epidural catheter inserted w/o resistance to 5 cm in epidural space. Then 0.125% bupivicaine 15 cc with NS 5 cc.  Aspiration negative for blood and CSF.   Negative for neuro change, paresthesia or symptoms of intravascular injection or intrathecal injection.  Infusion orders written and infusion of 0.125% bupivicaine 15cc per hour started.    Nando Shah MD

## 2018-03-01 NOTE — PLAN OF CARE
Problem: Patient Care Overview  Goal: Plan of Care/Patient Progress Review  Outcome: Improving  Patient independent with self and infant cares. Vitally stable, voiding without issue, fundal checks WDL. Patient states pain is minimal, prn Ibuprofen given x1. Breastfeeding with some assistance from staff. Patient eager to learn, asking appropriate questions. Family members visiting on and off most of shift, supportive.  present and supportive.

## 2018-03-01 NOTE — ANESTHESIA POSTPROCEDURE EVALUATION
Patient: Kat Godoy      S/P epidural for labor.   I or my partner was immediately available for management of this patient during epidural analgesia infusion.  VSS.  Doing well. Block resolved.  Neuro at baseline. Denies positional headache. Minimal side effects easily managed w/ PRN meds. No apparent anesthetic complications. No follow-up required.    Mukesh Wiseman MD    Note:  Anesthesia Post Evaluation    Last vitals:  Vitals:    03/01/18 0615 03/01/18 0630 03/01/18 0645   BP: 111/58 114/64 112/58   Resp:      Temp:            Electronically Signed By: Mukesh Wiseman MD  March 1, 2018  9:36 AM

## 2018-03-01 NOTE — PROVIDER NOTIFICATION
02/28/18 2020   Provider Notification   Provider Name/Title Dr. Hollis   Method of Notification In Department   Request Evaluate - Remote   Notification Reason Decels;SVE;Other (Comment)     OB updated SVE 7/90/0. Order to recheck cervix at 2200 and restart pit at that time if unchanged. Aware of decels. Continue to monitor.

## 2018-03-01 NOTE — PROGRESS NOTES
Met pt earlier (8 pm) at which time she was 7 cm and slowly progressing on her own.  Scalp electrode showed what appeared to be a fetal arrhythmia but heart rate maintaining in normal range.  12:30 placed IUPC as uterus has been difficult to monitor throughout labor.  Cx still at 7cm/100%/0 station.    IUPC placed and pit running  Plan:  As long as baby tolerates the increase will actively manage labor to a limit of mid teens mu of pit.  I'll recheck in 2 hours.  If still the same will still manage with pit for a few hours longer.  Anticipate

## 2018-03-01 NOTE — PROGRESS NOTES
Delivery Summary    Kat Godoy MRN# [id number]   Age: 32 year old YOB: 1985     ASSESSMENT & PLAN: Pt went on to deliver a viable baby boy with thick mec.  Nursery staff present.  Delivery was  with right labial transection and posterior 1st degree tear,  Both repaired with 3-0 vicryl  Placenta intact          Labor Event Times    Labor onset date:  18 Onset time:   4:30 PM   Dilation complete date:  3/1/18 Complete time:   2:25 AM   Start pushing date/time:  3/1/2018 0236            Labor Length    1st Stage (hrs):  9 (min):  55   2nd Stage (hrs):  2 (min):  45   3rd Stage (hrs):  0 (min):  2      Labor Events     labor?:  No    steroids:  None   Labor Type:  Induction   Predominate monitoring during 1st stage:  continuous electronic fetal monitoring      Antibiotics received during labor?:  Yes   Reason for Antibiotics:  GBS   Antibiotics received for GBS:  Penicillin   Antibiotics Given (GBS):  Greater than 4 hours prior to delivery      Rupture identifier:  Rupture 1   Rupture date/time: 18 1413   Rupture type:  Artificial Rupture of Membranes   Fluid color:  Meconium      Induction:  Oxytocin, AROM   Induction date/time:     Cervical ripening date/time:        1:1 continuous labor support provided by?:  RN          Delivery/Placenta Date and Time    Delivery Date:  3/1/18 Delivery Time:   5:10 AM   Placenta Date/Time:  3/1/2018  5:12 AM   Oxytocin given at the time of delivery:  after delivery of placenta      Vaginal Counts    Initial count performed by 2 team members:   Two Team Members   Dr. Bunny Sanon RN          [employer]   Initial counts 2  5    Added to count  1 5    Final counts 2 1 10       Placed during labor Accounted for at the end of labor   Yes Yes   Yes Yes   NA NA      Final count performed by 2 team members:   Two Team Members   Mariel Hollis               Apgars    Living  status:  Living    1 Minute 5 Minute 10 Minute 15 Minute 20 Minute   Skin color: 0  1       Heart rate: 2  2       Reflex irritability: 2  2       Muscle tone: 2  2       Respiratory effort: 2  2       Total: 8  9          Apgars assigned by:  JOSÉ MIGUEL AMEZCUA CNP NNP      Cord    Vessels:  3 Vessels Complications:  None   Cord Blood Disposition:  Lab Gases Sent?:  Yes         [place] Resuscitation       [place] Care at Delivery:  Dr Hollis requested our attendance for this term infant with meconium stained fluid. Infant cried at perineum. Brought to warmer, covered in meconium. Warmed and dried, good heart rate, color and tone. Color pink in room air.  To NBN for continued care.  SEVEN Thompson, CNP 3/1/2018 5:23 AM        Skin to Skin and Feeding Plan    Skin to skin initiation date/time: 3/1/18 0515   Skin to skin with:  Mother   Skin to skin end date/time:        Delivery (Maternal) (Provider to Complete) ([id number])          Mother's Information  Mother: Kat Godoy #[id number]    Start of Mother's Information     IO Blood Loss  18 1630 - 18 0539    Mom's I/O Activity            End of Mother's Information  Mother: Kat Godoy #[id number]            Placenta    Date/Time:  3/1/2018  5:12 AM      Anesthesia    Method:  Epidural   Analgesic:   BIRTH HISTORY: ANALGESIC   BUPIVACAINE 0.125%  ML NS EPIDURAL DRIP                       Evelio Hollis

## 2018-03-01 NOTE — PROVIDER NOTIFICATION
03/01/18 0225   Provider Notification   Provider Name/Title Dr. Hollis   Method of Notification At Bedside     OB here to evaluate labor progress

## 2018-03-01 NOTE — PLAN OF CARE
Data: Kat Godoy transferred to 450 via wheelchair at 0700. Baby transferred via parent's arms.  Action: Receiving unit notified of transfer: Yes. Patient and family notified of room change. Report given to Eli GILLETTE at 0705. Belongings sent to receiving unit. Accompanied by Registered Nurse. Oriented patient to surroundings. Call light within reach. ID bands double-checked with receiving RN.  Response: Patient tolerated transfer and is stable.

## 2018-03-01 NOTE — PLAN OF CARE
Patient much more uncomfortable with contractions, SVE 4/90/-1, requesting epidural. THAO updated, fluid bolus started. Dr Castelan updated.

## 2018-03-01 NOTE — PROVIDER NOTIFICATION
03/01/18 0015   Provider Notification   Provider Name/Title Dr. Hollis   Method of Notification At Bedside     OB here to place IUPC. OB will recheck cervix in 2 hours. Order to increase Pitocin by 2 every 20 minutes as able.

## 2018-03-02 PROCEDURE — 12000031 ZZH R&B OB CRITICAL

## 2018-03-02 PROCEDURE — 40000086 ZZH STATISTIC IP LACTATION SERVICES 46-60 MIN

## 2018-03-02 PROCEDURE — 25000132 ZZH RX MED GY IP 250 OP 250 PS 637: Performed by: OBSTETRICS & GYNECOLOGY

## 2018-03-02 RX ADMIN — SENNOSIDES AND DOCUSATE SODIUM 1 TABLET: 8.6; 5 TABLET ORAL at 13:43

## 2018-03-02 RX ADMIN — IBUPROFEN 800 MG: 800 TABLET, FILM COATED ORAL at 05:14

## 2018-03-02 RX ADMIN — IBUPROFEN 800 MG: 800 TABLET, FILM COATED ORAL at 13:43

## 2018-03-02 RX ADMIN — IBUPROFEN 800 MG: 800 TABLET, FILM COATED ORAL at 19:32

## 2018-03-02 NOTE — PLAN OF CARE
Problem: Postpartum (Vaginal Delivery) (Adult,Obstetrics,Pediatric)  Goal: Signs and Symptoms of Listed Potential Problems Will be Absent, Minimized or Managed (Postpartum)  Signs and symptoms of listed potential problems will be absent, minimized or managed by discharge/transition of care (reference Postpartum (Vaginal Delivery) (Adult,Obstetrics,Pediatric) CPG).   Outcome: Improving  VSS. Postpartum assessment WDL - see flowsheet. Pt up ad ritu and voiding spontaneously without difficulty. Tolerating a general diet. Breastfeeding infant - encouraged pt to call for any breastfeeding assistance. Pt denied pain and declined pain medications despite offers. Continue to monitor.

## 2018-03-02 NOTE — PLAN OF CARE
Problem: Patient Care Overview  Goal: Plan of Care/Patient Progress Review  Outcome: Improving  Data: Vital signs within normal limits. Postpartum checks within normal limits - see flow record. Patient eating and drinking normally. Patient able to empty bladder independently and is up ambulating. No apparent signs of infection. Laceration healing well. Patient performing self cares and is able to care for infant.  Action: Patient medicated during the shift for pain. See MAR. Patient reassessed within 1 hour after each medication and pain was improved - patient stated she was comfortable. Patient education done about breastfeeding, see lactation notes. Patient to start pumping, utilizing donor milk for finger feedings. Nipples noted to be cracked/some bleeding present.   Response: Positive attachment behaviors observed with infant. Support persons  Christopher present.   Plan: Anticipate discharge on 3/3/18.

## 2018-03-02 NOTE — LACTATION NOTE
Lactation follow up due to patient's inability to latch  comfortably. Continues having severe pain with latch, and mother getting very frustrated with feeding but determined to keep going. Discussed options for the rest of her stay to keep breastfeeding going and prevent further damage. Offered using donor breast milk and pumping/hand expression to try and bring milk supply in and heal some damage to nipples. Education provided on benefits and risks of using HDM and/or formula. Patient signed consent for HDM and will be taught feeding with syringe/finger feeding. Education provided on breast pump use, cleaning, and sterilization. Floor nurse updated on plan for evening.

## 2018-03-02 NOTE — PLAN OF CARE
Problem: Patient Care Overview  Goal: Plan of Care/Patient Progress Review  Patient stable. Up ad ritu. Voiding without difficulty. Pain managed with Ibuprofen. Breastfeeding  with minimal staff assistance. Using hydrogels for tender nipples. Encouraged to call for assistance with latch. Bonding well with .

## 2018-03-02 NOTE — LACTATION NOTE
Lactation visit for latching assistance. Patient attempting to latch  in cradle hold on left breast. She has very large breasts, and both nipples are scabbed in center, red, and painful with latching. Offered assistance to latch her more deeply, which was attempted follow Latch 1,2,3 as explained to patient. Also encouraged her to use cross cradle position for better latching control, and elevate the level of her nipple with rolled wash cloth. Despite numerous attempts and assistance, patient's pain level was 8-9/10 with latching and continued after the first few seconds, never decreasing. Placed in laid back position to see if  able to attain deeper, more comfortable latch. Patient still continued to have very painful latch. Attempted implementing 24mm nipple shield with no decrease in pain. With oral assessment, 's tongue slightly comes past bottom gum line, but does not stay there with coordinated sucking and drops sharply across the gum line. Notified peds MD (see note in  chart), who will come assess further. Patient stating she is miserable with feedings and does not want to give up. Encouraged nipple roll/HE prior to latch, continue using shield if desired and proper education given on it's use and care, breast compression with feedings, hand expression after feedings to help bring in milk, and using hydrogel pads after feedings for healing and comfort. Will attempt to follow up with next feeding.

## 2018-03-02 NOTE — PROGRESS NOTES
[facility]  OB Post-partum progress note    Assessment: 32 year old  PPD#1 s/p     Plan:  - IUGR 13%, fetal pyelectasis: Peds aware,  doing well  - Lactation consult  - Obesity Body mass index is 43.75 kg/(m^2).   - Routine postpartum management  - Pain: controlled on PO pain meds  - Disposition: plans discharge home tomorrow    Heron Oshea MD  (pager [phone])  [facility]  3/2/2018    Subjective: Kat Godoy feels well this morning. Was able to sleep last night. Pain control adequate. Lochia moderate. Voiding. Breast feeding though some difficulty latching. Mood Good.     Objective:   Vitals:    18 1116 18 1702 18 0038 18 1017   BP: 127/65 116/62 123/64 119/74   Pulse: 87 97 90 93   Resp:    Temp:  98.2  F (36.8  C) 97.8  F (36.6  C) 95.8  F (35.4  C)   TempSrc:  Oral Oral Oral   Weight:       Height:           General: healthy, alert, no distress and over weight  Abd: soft, appropriately tender, fundus firm  Legs: Non-tender, 1+ pitting edema    Hemoglobin   Date Value Ref Range Status   2018 12.6 11.7 - 15.7 g/dL Final   2010 13.6 11.7 - 15.7 g/dL Final       Lab Results   Component Value Date    RH Pos 2018

## 2018-03-03 VITALS
TEMPERATURE: 98.1 F | DIASTOLIC BLOOD PRESSURE: 86 MMHG | WEIGHT: 247 LBS | SYSTOLIC BLOOD PRESSURE: 130 MMHG | BODY MASS INDEX: 43.77 KG/M2 | RESPIRATION RATE: 16 BRPM | HEIGHT: 63 IN | HEART RATE: 88 BPM

## 2018-03-03 PROBLEM — O99.213 OBESITY AFFECTING PREGNANCY IN THIRD TRIMESTER, ANTEPARTUM: Status: RESOLVED | Noted: 2018-02-28 | Resolved: 2018-03-03

## 2018-03-03 PROBLEM — B95.1 GROUP BETA STREP POSITIVE: Status: RESOLVED | Noted: 2018-02-28 | Resolved: 2018-03-03

## 2018-03-03 PROBLEM — O35.EXX0 PYELECTASIS OF FETUS ON PRENATAL ULTRASOUND: Status: RESOLVED | Noted: 2018-02-28 | Resolved: 2018-03-03

## 2018-03-03 PROBLEM — O48.0 POST-TERM PREGNANCY, 40-42 WEEKS OF GESTATION: Status: RESOLVED | Noted: 2018-02-28 | Resolved: 2018-03-03

## 2018-03-03 PROCEDURE — 25000132 ZZH RX MED GY IP 250 OP 250 PS 637: Performed by: OBSTETRICS & GYNECOLOGY

## 2018-03-03 RX ADMIN — IBUPROFEN 800 MG: 800 TABLET, FILM COATED ORAL at 04:50

## 2018-03-03 RX ADMIN — SENNOSIDES AND DOCUSATE SODIUM 1 TABLET: 8.6; 5 TABLET ORAL at 04:50

## 2018-03-03 RX ADMIN — IBUPROFEN 800 MG: 800 TABLET, FILM COATED ORAL at 11:09

## 2018-03-03 NOTE — PLAN OF CARE
Problem: Patient Care Overview  Goal: Plan of Care/Patient Progress Review  Outcome: Improving  Pt stable, vitals WNL. Pain managed well with ibuprofen. Pt ambulating and voiding ind. Pt pumping for . Discharge instructions reviewed with pt and significant other, all questions answered. [facility] referral sent. Breastpump given to pt. Discharged home at 1310 with significant other and .

## 2018-03-03 NOTE — PLAN OF CARE
Problem: Patient Care Overview  Goal: Plan of Care/Patient Progress Review  Outcome: Improving  Pt up ad ritu.  VSS.  Pain well controlled with ibuprofen.  Voiding.  Bilateral nipples remain cracked and painful.  Patient is taking a break breastfeeding and only pumping at this time.  Supplementing with Donor milk as needed.  FOB present and supportive.  Plan to discharge today.

## 2018-03-03 NOTE — DISCHARGE INSTRUCTIONS
Postpartum Vaginal Delivery Instructions    Lactation [phone]  [facility] [phone]    Activity       Ask family and friends for help when you need it.    Do not place anything in your vagina for 6 weeks.    You are not restricted on other activities, but take it easy for a few weeks to allow your body to recover from delivery.  You are able to do any activities you feel up to that point.    No driving until you have stopped taking your pain medications (usually two weeks after delivery).     Call your health care provider if you have any of these symptoms:       Increased pain, swelling, redness, or fluid around your stiches from an episiotomy or perineal tear.    A fever above 100.4 F (38 C) with or without chills when placing a thermometer under your tongue.    You soak a sanitary pad with blood within 1 hour, or you see blood clots larger than a golf ball.    Bleeding that lasts more than 6 weeks.    Vaginal discharge that smells bad.    Severe pain, cramping or tenderness in your lower belly area.    A need to urinate more frequently (use the toilet more often), more urgently (use the toilet very quickly), or it burns when you urinate.    Nausea and vomiting.    Redness, swelling or pain around a vein in your leg.    Problems breastfeeding or a red or painful area on your breast.    Chest pain and cough or are gasping for air.    Problems coping with sadness, anxiety, or depression.  If you have any concerns about hurting yourself or the baby, call your provider immediately.     You have questions or concerns after you return home.     Keep your hands clean:  Always wash your hands before touching your perineal area and stitches.  This helps reduce your risk of infection.  If your hands aren't dirty, you may use an alcohol hand-rub to clean your hands. Keep your nails clean and short.

## 2018-03-03 NOTE — DISCHARGE SUMMARY
"[facility] Postpartum/Discharge Note    S:  Patient without complaints.  Minimal lochia.    O:  Blood pressure 129/73, pulse 94, temperature 98.1  F (36.7  C), temperature source Oral, resp. rate 16, height 1.6 m (5' 3\"), weight 112 kg (247 lb), unknown if currently breastfeeding.        Urine output adequate        Abdomen - Fundus firm, at umbilicus, nontender        Extremities - No calf tenderness    A:   Postpartum Day# 2, s/p Vaginal delivery - doing well    P:  1)  Discharge home        2)  F/U 6 weeks w/ Primary OB        3)  Discharge meds: OTC ibuprofen    Savage Mcgovern MD          "

## 2018-03-03 NOTE — PLAN OF CARE
Problem: Patient Care Overview  Goal: Plan of Care/Patient Progress Review  Outcome: No Change  VSS, pumping and supplementing her infant with ebm and donor milk, cramping pain is controlled with ibuprofen; discussed discharge expectations for tomorrow, encouraged to fill out the paperwork; bonding well with baby, pleasant, cooperative.

## 2018-03-08 ENCOUNTER — TELEPHONE (OUTPATIENT)
Dept: OBGYN | Facility: CLINIC | Age: 33
End: 2018-03-08